# Patient Record
Sex: MALE | URBAN - METROPOLITAN AREA
[De-identification: names, ages, dates, MRNs, and addresses within clinical notes are randomized per-mention and may not be internally consistent; named-entity substitution may affect disease eponyms.]

---

## 2020-07-05 ENCOUNTER — OUT OF OFFICE VISIT (OUTPATIENT)
Dept: URBAN - METROPOLITAN AREA MEDICAL CENTER 23 | Facility: MEDICAL CENTER | Age: 55
End: 2020-07-05
Payer: COMMERCIAL

## 2020-07-05 DIAGNOSIS — R07.89 ACUTE CHEST WALL PAIN: ICD-10-CM

## 2020-07-05 DIAGNOSIS — K21.9 ACID REFLUX: ICD-10-CM

## 2020-07-05 DIAGNOSIS — R10.13 ABDOMINAL DISCOMFORT, EPIGASTRIC: ICD-10-CM

## 2020-07-05 DIAGNOSIS — I25.10 2-VESSEL CORONARY ARTERY DISEASE: ICD-10-CM

## 2020-07-05 PROCEDURE — 99204 OFFICE O/P NEW MOD 45 MIN: CPT | Performed by: INTERNAL MEDICINE

## 2020-07-06 ENCOUNTER — OUT OF OFFICE VISIT (OUTPATIENT)
Dept: URBAN - METROPOLITAN AREA MEDICAL CENTER 23 | Facility: MEDICAL CENTER | Age: 55
End: 2020-07-06
Payer: COMMERCIAL

## 2020-07-06 DIAGNOSIS — R07.89 ACUTE CHEST WALL PAIN: ICD-10-CM

## 2020-07-06 DIAGNOSIS — K21.9 ACID REFLUX: ICD-10-CM

## 2020-07-06 DIAGNOSIS — R10.13 ABDOMINAL DISCOMFORT, EPIGASTRIC: ICD-10-CM

## 2020-07-06 DIAGNOSIS — I25.10 2-VESSEL CORONARY ARTERY DISEASE: ICD-10-CM

## 2020-07-06 PROCEDURE — 99213 OFFICE O/P EST LOW 20 MIN: CPT | Performed by: INTERNAL MEDICINE

## 2024-09-04 ENCOUNTER — DASHBOARD ENCOUNTERS (OUTPATIENT)
Age: 59
End: 2024-09-04

## 2024-09-04 ENCOUNTER — OFFICE VISIT (OUTPATIENT)
Dept: URBAN - METROPOLITAN AREA CLINIC 33 | Facility: CLINIC | Age: 59
End: 2024-09-04
Payer: COMMERCIAL

## 2024-09-04 VITALS
WEIGHT: 217 LBS | HEIGHT: 68 IN | HEART RATE: 86 BPM | OXYGEN SATURATION: 96 % | DIASTOLIC BLOOD PRESSURE: 78 MMHG | SYSTOLIC BLOOD PRESSURE: 108 MMHG | BODY MASS INDEX: 32.89 KG/M2

## 2024-09-04 DIAGNOSIS — K52.89 (LYMPHOCYTIC) MICROSCOPIC COLITIS: ICD-10-CM

## 2024-09-04 DIAGNOSIS — I50.9 CHRONIC CONGESTIVE HEART FAILURE, UNSPECIFIED HEART FAILURE TYPE: ICD-10-CM

## 2024-09-04 PROBLEM — 441509002: Status: ACTIVE | Noted: 2024-09-04

## 2024-09-04 PROBLEM — 428375006: Status: ACTIVE | Noted: 2024-09-04

## 2024-09-04 PROBLEM — 266257000: Status: ACTIVE | Noted: 2024-09-04

## 2024-09-04 PROBLEM — 88805009: Status: ACTIVE | Noted: 2024-09-04

## 2024-09-04 PROBLEM — 236071009: Status: ACTIVE | Noted: 2024-09-04

## 2024-09-04 PROCEDURE — 99204 OFFICE O/P NEW MOD 45 MIN: CPT | Performed by: INTERNAL MEDICINE

## 2024-09-04 RX ORDER — LISINOPRIL 20 MG/1
1 TABLET TABLET ORAL ONCE A DAY
Status: ACTIVE | COMMUNITY

## 2024-09-04 RX ORDER — CLOPIDOGREL 75 MG/1
1 TABLET TABLET, FILM COATED ORAL ONCE A DAY
Status: ACTIVE | COMMUNITY

## 2024-09-04 RX ORDER — EZETIMIBE 10 MG/1
1 TABLET TABLET ORAL ONCE A DAY
Status: ACTIVE | COMMUNITY

## 2024-09-04 RX ORDER — RIVAROXABAN 20 MG/1
1 TABLET WITH FOOD TABLET, FILM COATED ORAL ONCE A DAY
Status: ACTIVE | COMMUNITY

## 2024-09-04 RX ORDER — VANCOMYCIN HYDROCHLORIDE 125 MG/1
1 CAPSULE CAPSULE ORAL
Qty: 40 CAPSULE | Refills: 0 | OUTPATIENT
Start: 2024-09-04 | End: 2024-09-14

## 2024-09-04 RX ORDER — METOPROLOL TARTRATE 25 MG/1
1 TABLET WITH FOOD TABLET, FILM COATED ORAL TWICE A DAY
Status: ACTIVE | COMMUNITY

## 2024-09-04 RX ORDER — ATORVASTATIN CALCIUM 40 MG/1
1 TABLET TABLET, FILM COATED ORAL ONCE A DAY
Status: ACTIVE | COMMUNITY

## 2024-09-04 RX ORDER — TOPIRAMATE 25 MG/1
1 TABLET TABLET, COATED ORAL ONCE A DAY
Status: ACTIVE | COMMUNITY

## 2024-09-04 RX ORDER — FUROSEMIDE 20 MG/1
1 TABLET TABLET ORAL ONCE A DAY
Status: ACTIVE | COMMUNITY

## 2024-09-04 RX ORDER — SUCRALFATE 1 G/1
1 TABLET ON AN EMPTY STOMACH TABLET ORAL TWICE A DAY
Status: ACTIVE | COMMUNITY

## 2024-09-04 RX ORDER — PANTOPRAZOLE SODIUM 40 MG/1
1 TABLET TABLET, DELAYED RELEASE ORAL ONCE A DAY
Status: ACTIVE | COMMUNITY

## 2024-09-04 RX ORDER — RANOLAZINE 500 MG/1
1 TABLET TABLET, EXTENDED RELEASE ORAL TWICE A DAY
Status: ACTIVE | COMMUNITY

## 2024-09-04 RX ORDER — MAGNESIUM OXIDE 400 MG/1
1 TABLET AS NEEDED TABLET ORAL ONCE A DAY
Status: ACTIVE | COMMUNITY

## 2024-09-04 RX ORDER — CITALOPRAM 20 MG/1
1 TABLET TABLET, FILM COATED ORAL ONCE A DAY
Status: ACTIVE | COMMUNITY

## 2024-09-04 RX ORDER — LEVETIRACETAM 500 MG/1
1 TABLET TABLET, FILM COATED ORAL
Status: ACTIVE | COMMUNITY

## 2024-09-04 NOTE — HPI-CHANGE IN BOWEL HABITS
59 year old male patient presents for change in bowel habits consult . Pt admits symptoms began approximately 6 weeks ago .  Patient recently went to [facility] ER for chronic diarrhea , nausea, abdominal pain , and rectal bleeding.   Patient admits 4-5 bowel movements a day with loose watery stools.   He admits trying Maalox and pepto Bismol for relief without any improvement . He admits several incidents of fecal incontinece throughout the day . Admits taking recent antibiotics in July for COVID a course of Augmentin and AZITHROMYCIN. Patient denies any recent stools studies .  Patient admits nighttime symtoms causing him to wake middle of the night with fecal incontinence . He states he bowel habits are uncontrollable . Patient admits seeing dark red blood when wiping .   Patient admits  previous colonoscopy in 2019 completed by Dr Yahaira Dorman only reveling diverticulosis .  CT Abdomen (08.29.2024)  IMPRESSION: 1. Mild colonic wall thickening involving the rectosigmoid colon without abnormal bowel distention or pericolonic inflammatory change. This is suggestive of colitis of likely infectious or inflammatory etiology. 2.  Small Hiatal Hernia  Labs [facility]  (08/29/2024)  MCHC 35.6 H RDW 14.1 H Platelets 149 L  Creatnine 1.5 H Osmo 271 L  GFR 53 L

## 2024-09-04 NOTE — HPI-ABDOMINAL PAIN
Patient complains of lower left  abdominal pain for the past 6 weeks . He describes symptoms as a severe cramp or if something is being pulled . He denies taking nay medications for relief . He states pain is constant throughout the day and states whenever he coughs symptoms are intensified . He states doubling over at times and grabbing his abdomen . Patient admits nausea without vomitng .

## 2024-09-23 ENCOUNTER — OFFICE VISIT (OUTPATIENT)
Dept: URBAN - METROPOLITAN AREA CLINIC 33 | Facility: CLINIC | Age: 59
End: 2024-09-23

## 2024-09-24 ENCOUNTER — CLAIMS CREATED FROM THE CLAIM WINDOW (OUTPATIENT)
Dept: URBAN - METROPOLITAN AREA MEDICAL CENTER 27 | Facility: MEDICAL CENTER | Age: 59
End: 2024-09-24

## 2024-09-24 PROCEDURE — 99254 IP/OBS CNSLTJ NEW/EST MOD 60: CPT

## 2024-09-25 ENCOUNTER — CLAIMS CREATED FROM THE CLAIM WINDOW (OUTPATIENT)
Dept: URBAN - METROPOLITAN AREA MEDICAL CENTER 27 | Facility: MEDICAL CENTER | Age: 59
End: 2024-09-25

## 2024-09-25 PROCEDURE — 99232 SBSQ HOSP IP/OBS MODERATE 35: CPT | Performed by: NURSE PRACTITIONER

## 2024-09-26 ENCOUNTER — CLAIMS CREATED FROM THE CLAIM WINDOW (OUTPATIENT)
Dept: URBAN - METROPOLITAN AREA MEDICAL CENTER 27 | Facility: MEDICAL CENTER | Age: 59
End: 2024-09-26

## 2024-09-26 PROCEDURE — 43235 EGD DIAGNOSTIC BRUSH WASH: CPT | Performed by: INTERNAL MEDICINE

## 2024-09-26 PROCEDURE — 45380 COLONOSCOPY AND BIOPSY: CPT | Performed by: INTERNAL MEDICINE

## 2024-09-27 ENCOUNTER — CLAIMS CREATED FROM THE CLAIM WINDOW (OUTPATIENT)
Dept: URBAN - METROPOLITAN AREA MEDICAL CENTER 27 | Facility: MEDICAL CENTER | Age: 59
End: 2024-09-27

## 2024-09-27 PROCEDURE — 99232 SBSQ HOSP IP/OBS MODERATE 35: CPT | Performed by: INTERNAL MEDICINE

## 2024-09-28 ENCOUNTER — CLAIMS CREATED FROM THE CLAIM WINDOW (OUTPATIENT)
Dept: URBAN - METROPOLITAN AREA MEDICAL CENTER 27 | Facility: MEDICAL CENTER | Age: 59
End: 2024-09-28

## 2024-09-28 PROCEDURE — 99232 SBSQ HOSP IP/OBS MODERATE 35: CPT | Performed by: INTERNAL MEDICINE

## 2024-10-30 ENCOUNTER — OFFICE VISIT (OUTPATIENT)
Dept: URBAN - METROPOLITAN AREA CLINIC 23 | Facility: CLINIC | Age: 59
End: 2024-10-30

## 2024-10-30 VITALS
BODY MASS INDEX: 30.77 KG/M2 | TEMPERATURE: 97.3 F | WEIGHT: 203 LBS | HEART RATE: 89 BPM | SYSTOLIC BLOOD PRESSURE: 129 MMHG | DIASTOLIC BLOOD PRESSURE: 81 MMHG | HEIGHT: 68 IN

## 2024-10-30 RX ORDER — EZETIMIBE 10 MG/1
1 TABLET TABLET ORAL ONCE A DAY
Status: ACTIVE | COMMUNITY

## 2024-10-30 RX ORDER — SUCRALFATE 1 G/1
1 TABLET ON AN EMPTY STOMACH TABLET ORAL TWICE A DAY
Status: ACTIVE | COMMUNITY

## 2024-10-30 RX ORDER — CITALOPRAM 20 MG/1
1 TABLET TABLET, FILM COATED ORAL ONCE A DAY
Status: ACTIVE | COMMUNITY

## 2024-10-30 RX ORDER — LISINOPRIL 20 MG/1
1 TABLET TABLET ORAL ONCE A DAY
Status: ACTIVE | COMMUNITY

## 2024-10-30 RX ORDER — RANOLAZINE 500 MG/1
1 TABLET TABLET, EXTENDED RELEASE ORAL TWICE A DAY
Status: ACTIVE | COMMUNITY

## 2024-10-30 RX ORDER — PANTOPRAZOLE SODIUM 40 MG/1
1 TABLET TABLET, DELAYED RELEASE ORAL ONCE A DAY
Status: ACTIVE | COMMUNITY

## 2024-10-30 RX ORDER — METOPROLOL TARTRATE 25 MG/1
1 TABLET WITH FOOD TABLET, FILM COATED ORAL TWICE A DAY
Status: ACTIVE | COMMUNITY

## 2024-10-30 RX ORDER — ATORVASTATIN CALCIUM 40 MG/1
1 TABLET TABLET, FILM COATED ORAL ONCE A DAY
Status: ACTIVE | COMMUNITY

## 2024-10-30 RX ORDER — LEVETIRACETAM 500 MG/1
1 TABLET TABLET, FILM COATED ORAL
Status: ACTIVE | COMMUNITY

## 2024-10-30 RX ORDER — CLOPIDOGREL 75 MG/1
1 TABLET TABLET, FILM COATED ORAL ONCE A DAY
Status: ACTIVE | COMMUNITY

## 2024-10-30 RX ORDER — MAGNESIUM OXIDE 400 MG/1
1 TABLET AS NEEDED TABLET ORAL ONCE A DAY
Status: ACTIVE | COMMUNITY

## 2024-10-30 RX ORDER — RIVAROXABAN 20 MG/1
1 TABLET WITH FOOD TABLET, FILM COATED ORAL ONCE A DAY
Status: ACTIVE | COMMUNITY

## 2024-10-30 RX ORDER — FUROSEMIDE 20 MG/1
1 TABLET TABLET ORAL ONCE A DAY
Status: ACTIVE | COMMUNITY

## 2024-10-30 RX ORDER — TOPIRAMATE 25 MG/1
1 TABLET TABLET, COATED ORAL ONCE A DAY
Status: ACTIVE | COMMUNITY

## 2024-10-30 NOTE — HPI-CHANGE IN BOWEL HABITS
59 M here for f/u of chr diarrhea was admitted to [facility] for this COLON--mild L side colitis. bx negative BM 5-7 a day. loose. with blood. imodium didnt help him previous colonoscopy in 2019 completed by Dr Yahaira Dorman only reveling diverticulosis . CT Abdomen (08.29.2024)  IMPRESSION: 1. Mild colonic wall thickening involving the rectosigmoid colon without abnormal bowel distention or pericolonic inflammatory change. This is suggestive of colitis of likely infectious or inflammatory etiology. 2.  Small Hiatal Hernia

## 2024-11-08 ENCOUNTER — LAB OUTSIDE AN ENCOUNTER (OUTPATIENT)
Dept: URBAN - METROPOLITAN AREA CLINIC 23 | Facility: CLINIC | Age: 59
End: 2024-11-08

## 2024-11-08 LAB
PERFORMING LAB: (no result)
TSH: 1.49

## 2024-11-10 LAB
IMMUNOGLOBULIN A IGA: (no result)
PERFORMING LAB: (no result)

## 2024-11-11 LAB
GLIADIN DEAMID AB IGA: (no result)
PERFORMING LAB: (no result)
PERFORMING LAB: (no result)
TISSUE TRANSGLUT AB IGA: (no result)

## 2024-11-12 ENCOUNTER — LAB OUTSIDE AN ENCOUNTER (OUTPATIENT)
Dept: URBAN - METROPOLITAN AREA CLINIC 23 | Facility: CLINIC | Age: 59
End: 2024-11-12

## 2024-11-12 LAB
C DIFF CONSIST: (no result)
C DIFF QC: (no result)
CLOSTRIDIUM DIFFICILE PCR: (no result)
CLOSTRIDIUM DIFFICILE TOXIN/ANTIGEN: NOT DETECTED
ENDOMYSIAL AB: (no result)
NAP1: (no result)
PERFORMING LAB: (no result)

## 2024-11-13 ENCOUNTER — TELEPHONE ENCOUNTER (OUTPATIENT)
Dept: URBAN - METROPOLITAN AREA CLINIC 23 | Facility: CLINIC | Age: 59
End: 2024-11-13

## 2024-11-13 LAB
ADENOVIRUS F 40/41: (no result)
ASTROVIRUS: (no result)
CAMPYLOBACTER: (no result)
CRYPTOSPORIDIUM: (no result)
CYCLOSPORA: (no result)
ENTAMOEBA HISTOLYTICA: (no result)
ENTEROAGGREGATIVE E COLI: (no result)
ENTEROPATHOGENIC E COLI: (no result)
ENTEROTOXIGENIC E COLI: (no result)
GIARDIA LAMBLIA: (no result)
GIPCR SPECIMEN CONSISTENCY: (no result)
NOROVIRUS GI/GII: (no result)
PERFORMING LAB: (no result)
PLESIOMONAS SHIGELLOIDES: (no result)
ROTAVIRUS A: (no result)
SALMONELLA: (no result)
SAPOVIRUS: (no result)
SHIGA LIKE TOXIN: (no result)
SHIGELLA/ENTEROINVASIVE E COLI: (no result)
VIBRIO CHOLERA: (no result)
VIBRIO: (no result)
YERSINIA ENTEROLITICA: (no result)

## 2024-11-13 RX ORDER — VANCOMYCIN HYDROCHLORIDE 250 MG/1
1 CAPSULE ORAL
Qty: 56 | OUTPATIENT
Start: 2024-11-13 | End: 2024-11-27

## 2024-12-04 ENCOUNTER — OFFICE VISIT (OUTPATIENT)
Dept: URBAN - METROPOLITAN AREA CLINIC 23 | Facility: CLINIC | Age: 59
End: 2024-12-04

## 2024-12-12 ENCOUNTER — OFFICE VISIT (OUTPATIENT)
Dept: URBAN - METROPOLITAN AREA CLINIC 23 | Facility: CLINIC | Age: 59
End: 2024-12-12